# Patient Record
Sex: MALE | Race: BLACK OR AFRICAN AMERICAN | Employment: PART TIME | URBAN - METROPOLITAN AREA
[De-identification: names, ages, dates, MRNs, and addresses within clinical notes are randomized per-mention and may not be internally consistent; named-entity substitution may affect disease eponyms.]

---

## 2017-03-01 ENCOUNTER — OFFICE VISIT (OUTPATIENT)
Dept: INTERNAL MEDICINE CLINIC | Age: 27
End: 2017-03-01

## 2017-03-01 VITALS
WEIGHT: 268 LBS | TEMPERATURE: 97.8 F | OXYGEN SATURATION: 98 % | HEART RATE: 103 BPM | HEIGHT: 69 IN | DIASTOLIC BLOOD PRESSURE: 67 MMHG | SYSTOLIC BLOOD PRESSURE: 120 MMHG | RESPIRATION RATE: 15 BRPM | BODY MASS INDEX: 39.69 KG/M2

## 2017-03-01 DIAGNOSIS — L73.2 HIDRADENITIS: Primary | ICD-10-CM

## 2017-03-01 DIAGNOSIS — R07.89 CHEST TIGHTNESS: ICD-10-CM

## 2017-03-01 DIAGNOSIS — R05.9 COUGH: ICD-10-CM

## 2017-03-01 DIAGNOSIS — R06.89 TROUBLE BREATHING: ICD-10-CM

## 2017-03-01 RX ORDER — CLINDAMYCIN PHOSPHATE 11.9 MG/ML
SOLUTION TOPICAL
Qty: 60 ML | Refills: 5 | Status: SHIPPED | OUTPATIENT
Start: 2017-03-01 | End: 2017-06-27 | Stop reason: ALTCHOICE

## 2017-03-01 RX ORDER — METHYLPREDNISOLONE 4 MG/1
TABLET ORAL
Qty: 1 DOSE PACK | Refills: 0 | Status: SHIPPED | OUTPATIENT
Start: 2017-03-01 | End: 2017-06-27 | Stop reason: ALTCHOICE

## 2017-03-01 RX ORDER — DOXYCYCLINE 100 MG/1
100 TABLET ORAL DAILY
COMMUNITY
End: 2017-06-27 | Stop reason: ALTCHOICE

## 2017-03-01 RX ORDER — ALBUTEROL SULFATE 90 UG/1
2 AEROSOL, METERED RESPIRATORY (INHALATION)
Qty: 1 INHALER | Refills: 4 | Status: SHIPPED | OUTPATIENT
Start: 2017-03-01

## 2017-03-01 NOTE — MR AVS SNAPSHOT
Visit Information Date & Time Provider Department Dept. Phone Encounter #  
 3/1/2017  3:45 PM Niurka Samuels MD [facility] and [place] [id number] [id number] Follow-up Instructions Return in about 1 month (around 4/1/2017), or if symptoms worsen or fail to improve, for Asthma Control. Follow-up and Disposition History Your Appointments 3/1/2017  3:45 PM  
ACUTE CARE with Niurka Samuels MD  
[address] [place] [address] and Primary Care [facility] Appt Note: breathing issues [address] [facility]  
  
   
 [address] [id number] Upcoming Health Maintenance Date Due DTaP/Tdap/Td series (2 - Td) 3/1/2027 Allergies as of 3/1/2017  Review Complete On: 3/1/2017 By: Niurka Samuels MD  
 No Known Allergies Current Immunizations  Never Reviewed No immunizations on file. Not reviewed this visit You Were Diagnosed With   
  
 Codes Comments Hidradenitis    -  Primary ICD-10-CM: L73.2 ICD-9-CM: 705.83 Trouble breathing     ICD-10-CM: R06.89 
ICD-9-CM: 786.09 Cough     ICD-10-CM: R05 ICD-9-CM: 617. 2 Chest tightness     ICD-10-CM: R07.89 ICD-9-CM: 786.59 Vitals BP  
  
  
  
  
  
 120/67 (BP 1 Location: Left arm, BP Patient Position: Sitting) Vitals History BMI and BSA Data Body Mass Index Body Surface Area  
 39.58 kg/m 2 2.43 m 2 Preferred Pharmacy Pharmacy Name Phone [facility] [address] [place] [address] [phone] Your Updated Medication List  
  
   
This list is accurate as of: 3/1/17  3:41 PM.  Always use your most recent med list.  
  
  
  
  
 albuterol 90 mcg/actuation inhaler Commonly known as:  PROVENTIL HFA, VENTOLIN HFA, PROAIR HFA Take 2 Puffs by inhalation every four (4) hours as needed for Wheezing. clindamycin 1 % external solution Commonly known as:  CLEOCIN T  
use thin film on affected thigh and scalp areas. Wash hands thoruughly in between locations. doxycycline 100 mg tablet Commonly known as:  ADOXA Take 100 mg by mouth daily. ergocalciferol 50,000 unit capsule Commonly known as:  ERGOCALCIFEROL Take 1 capsule daily x 7 days, then take 1 capsule weekly x 7 weeks. Indications: VITAMIN D DEFICIENCY (HIGH DOSE THERAPY) methylPREDNISolone 4 mg tablet Commonly known as:  Tommie Etienne Per instruction Prescriptions Sent to Pharmacy Refills  
 clindamycin (CLEOCIN T) 1 % external solution 5 Sig: use thin film on affected thigh and scalp areas. Wash hands thoruughly in between locations. Class: Normal  
 Pharmacy: [address] Ph #: [phone]  
 methylPREDNISolone (MEDROL DOSEPACK) 4 mg tablet 0 Sig: Per instruction Class: Normal  
 Pharmacy: [address] Ph #: [phone]  
 albuterol (PROVENTIL HFA, VENTOLIN HFA, PROAIR HFA) 90 mcg/actuation inhaler 4 Sig: Take 2 Puffs by inhalation every four (4) hours as needed for Wheezing. Class: Normal  
 Pharmacy: [address] Ph #: [phone] Route: Inhalation Follow-up Instructions Return in about 1 month (around 4/1/2017), or if symptoms worsen or fail to improve, for Asthma Control. Introducing [facility]! Luisito Rivera introduces [employer] patient portal. Now you can access parts of your medical record, email your doctor's office, and request medication refills online. 1. In your internet browser, go to [email or web address] 2. Click on the First Time User? Click Here link in the Sign In box. You will see the New Member Sign Up page. 3. Enter your [employer] Access Code exactly as it appears below.  You will not need to use this code after youve completed the sign-up process. If you do not sign up before the expiration date, you must request a new code. · [employer] Access Code: [email or web address] Expires: 5/30/2017  3:41 PM 
 
4. Enter the last four digits of your Social Security Number (xxxx) and Date of Birth (mm/dd/yyyy) as indicated and click Submit. You will be taken to the next sign-up page. 5. Create a [employer] ID. This will be your [employer] login ID and cannot be changed, so think of one that is secure and easy to remember. 6. Create a [employer] password. You can change your password at any time. 7. Enter your Password Reset Question and Answer. This can be used at a later time if you forget your password. 8. Enter your e-mail address. You will receive e-mail notification when new information is available in [address] 9. Click Sign Up. You can now view and download portions of your medical record. 10. Click the Download Summary menu link to download a portable copy of your medical information. If you have questions, please visit the Frequently Asked Questions section of the [employer] website. Remember, [employer] is NOT to be used for urgent needs. For medical emergencies, dial 911. Now available from your iPhone and Android! Please provide this summary of care documentation to your next provider. Your primary care clinician is listed as [employer] If you have any questions after today's visit, please call [phone].

## 2017-03-01 NOTE — PROGRESS NOTES
Mr. Latonia Bunn is a 23y.o. year old male who had concerns including Breathing Problem; Skin Problem; and Chest Pain. HPI:  Chief Complaint    Patient presents with    Breathing Problem     for 4-5 months ; pt could not afford medication for inhaler and steroids.  Skin Problem     on scalp, itchy, for 1 year, medication that was prescribed is not helping, seen by dermatology - doxy bid x 2 weeks, then take daily- continuation was the plan  It helped but didn't clear up, persistent with refills.  Chest Pain     rated pain 4 out of 10 for 4-5months, sporadically        Past Medical History:   Diagnosis Date    Asthma     Bronchitis    Bronchitis      Current Outpatient Prescriptions   Medication Sig Dispense    clindamycin (CLEOCIN T) 1 % external solution use thin film on affected thigh and scalp areas. Wash hands thoruughly in between locations. 60 mL    methylPREDNISolone (MEDROL DOSEPACK) 4 mg tablet Per instruction 1 Dose Pack    albuterol (PROVENTIL HFA, VENTOLIN HFA, PROAIR HFA) 90 mcg/actuation inhaler Take 2 Puffs by inhalation every four (4) hours as needed for Wheezing. 1 Inhaler    doxycycline (ADOXA) 100 mg tablet Take 100 mg by mouth daily.  ergocalciferol (ERGOCALCIFEROL) 50,000 unit capsule Take 1 capsule daily x 7 days, then take 1 capsule weekly x 7 weeks. Indications: VITAMIN D DEFICIENCY (HIGH DOSE THERAPY) 14 Cap     No current facility-administered medications for this visit. Reviewed PmHx, RxHx, FmHx, SocHx, AllgHx and updated and dated in the chart.     ROS: Negative except for BOLD  General: fever, chills, fatigue  Respiratory: cough, SOB, wheezing  Cardiovascular:  CP, palpitation, CARTAGENA, edema   Gastrointestinal: N/V/D, bleeding  Genito-Urinary: dysuria, hematuria  Musculoskeletal: muscle weakness, pain, swelling    OBJECTIVE:   Visit Vitals    /67 (BP 1 Location: Left arm, BP Patient Position: Sitting)    Pulse (!) 103    Temp 97.8 °F (36.6 °C) (Oral)    Resp 15    Ht 5' 9\" (1.753 m)    Wt 268 lb (121.6 kg)    SpO2 98%    BMI 39.58 kg/m2     GEN: The patient appears well, alert, oriented x 3, in no distress. ENT: bilateral TM and canal normal.  Neck supple. No adenopathy or thyromegaly. KYM. Lungs: clear bilaterally, good air entry, no wheezes, rhonchi or rales. Cardiovascular: regular rate and rhythm. S1 and S2 normal, no murmurs,  Abdomen: + BS, soft without tenderness, guarding, rebound, mass or organomegaly. Extremities: no edema, normal peripheral pulses. Neurological: normal, gross sensory and motor in tact without focal findings. Derm; scaling and slight erythema, escoriation, dry scalp      Assessment/ Plan:       ICD-10-CM ICD-9-CM    1. Hidradenitis L73.2 705.83 clindamycin (CLEOCIN T) 1 % external solution   2. Trouble breathing R06.89 786.09 methylPREDNISolone (MEDROL DOSEPACK) 4 mg tablet   3. Cough R05 786.2 methylPREDNISolone (MEDROL DOSEPACK) 4 mg tablet   4. Chest tightness R07.89 786.59        Continue doxy per dermatology. I have discussed the diagnosis with the patient and the intended plan as seen in the above orders. The patient has received an after-visit summary and questions were answered concerning future plans. Medication Side Effects and Warnings were discussed with patient. Follow-up Disposition:  Return in about 1 month (around 4/1/2017), or if symptoms worsen or fail to improve, for Asthma Control.       Vish Alexandra MD

## 2017-03-01 NOTE — PROGRESS NOTES
Silvia Shankar is a 23 y.o. male  Chief Complaint   Patient presents with    Breathing Problem     for 4-5 months     Skin Problem     on scalp, itchy, for 1 year, medication that was prescribed is not helping     1. Have you been to the ER, urgent care clinic since your last visit? Hospitalized since your last visit? No    2. Have you seen or consulted any other health care providers outside of the [address] [place] [address] since your last visit? Include any pap smears or colon screening.  No

## 2017-06-27 ENCOUNTER — OFFICE VISIT (OUTPATIENT)
Dept: INTERNAL MEDICINE CLINIC | Age: 27
End: 2017-06-27

## 2017-06-27 VITALS
WEIGHT: 281.4 LBS | BODY MASS INDEX: 41.68 KG/M2 | SYSTOLIC BLOOD PRESSURE: 127 MMHG | OXYGEN SATURATION: 98 % | HEIGHT: 69 IN | DIASTOLIC BLOOD PRESSURE: 81 MMHG | TEMPERATURE: 97.9 F | RESPIRATION RATE: 16 BRPM | HEART RATE: 85 BPM

## 2017-06-27 DIAGNOSIS — J98.01 BRONCHOSPASM: Primary | ICD-10-CM

## 2017-06-27 DIAGNOSIS — M79.645 FINGER PAIN, LEFT: ICD-10-CM
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
## 2017-06-27 RX ORDER — AMOXICILLIN 500 MG/1
TABLET, FILM COATED ORAL 2 TIMES DAILY
COMMUNITY

## 2017-06-27 NOTE — PROGRESS NOTES
Mr. Radhika Hart is a 33y.o. year old male who had concerns including Asthma; Finger Swelling; and Ankle Pain. HPI:  Chief Complaint   Patient presents with    Asthma     Follow up, Pt is having \":breathing hesitation\" usually at work with lots of dust. He doesn't wear a mask ; significant improvement compared to before.  Finger Swelling     Left index finger. And pain    Ankle Pain     Pain around achilles and bump. Pt put ice on it, states swelling went down       Past Medical History:   Diagnosis Date    Asthma     Bronchitis    Bronchitis      Current Outpatient Prescriptions   Medication Sig Dispense    amoxicillin 500 mg tab Take  by mouth two (2) times a day.  albuterol (PROVENTIL HFA, VENTOLIN HFA, PROAIR HFA) 90 mcg/actuation inhaler Take 2 Puffs by inhalation every four (4) hours as needed for Wheezing. 1 Inhaler     No current facility-administered medications for this visit. Reviewed PmHx, RxHx, FmHx, SocHx, AllgHx and updated and dated in the chart. ROS: Negative except for BOLD  General: fever, chills, fatigue  Respiratory: cough, SOB, wheezing  Cardiovascular:  CP, palpitation, CARTAGENA, edema   Gastrointestinal: N/V/D, bleeding  Genito-Urinary: dysuria, hematuria  Musculoskeletal: muscle weakness, pain, swelling    OBJECTIVE:   Visit Vitals    /81 (BP 1 Location: Left arm, BP Patient Position: Sitting)    Pulse 85    Temp 97.9 °F (36.6 °C) (Oral)    Resp 16    Ht 5' 9\" (1.753 m)    Wt 281 lb 6.4 oz (127.6 kg)    SpO2 98%     L/min    BMI 41.56 kg/m2     GEN: The patient appears well, alert, oriented x 3, in no distress. ENT: bilateral TM and canal normal.  Neck supple. No adenopathy or thyromegaly. KYM. Neck: non inflammed papules nape of neck  Lungs: clear bilaterally, good air entry, no wheezes, rhonchi or rales. Cardiovascular: regular rate and rhythm.  S1 and S2 normal, no murmurs,  Abdomen: + BS, soft without tenderness, guarding, rebound, mass or organomegaly. Extremities: no edema, normal peripheral pulses. Neurological: normal, gross sensory and motor in tact without focal weakness  Hand: left index finger slightly tight DIP enderness, FROM finger, normal joint movements. No trigger. Normal IL wrist and shoulder exam. Normal CL hand exam.       Assessment/ Plan:       ICD-10-CM ICD-9-CM    1. Bronchospasm J98.01 519.11    2. Finger pain, left M79.645 729.5    3. Overuse injury T14.8 848.9    4. BMI 40.0-44.9, adult ([facility]) Z68.41 V85.41        Breathing at baseline goal. Weight loss, increase pulmonary exercise. Mask at work to minimize flare ups as tolerated. Amoxil from derm is clearning and keeps nape of neck folliculitis controlled. I have discussed the diagnosis with the patient and the intended plan as seen in the above orders. The patient has received an after-visit summary and questions were answered concerning future plans. Medication Side Effects and Warnings were discussed with patient. Follow-up Disposition:  Return in about 3 months (around 9/27/2017), or if symptoms worsen or fail to improve, for Asthma Control, Annual Exam, yearly.       Shawnee Silveira MD

## 2017-06-27 NOTE — PATIENT INSTRUCTIONS
Thumb Arthritis: Exercises  Your Care Instructions  Here are some examples of exercises for thumb arthritis. Start each exercise slowly. Ease off the exercise if you start to have pain. Your doctor or your physical or occupational therapist will tell you when you can start these exercises and which ones will work best for you. How to do the exercises  Thumb IP flexion    1. Place your forearm and hand on a table with your affected thumb pointing up. 2. With your other hand, hold your thumb steady just below the joint nearest your thumbnail. 3. Bend the tip of your thumb downward, then straighten it. 4. Repeat 8 to 12 times. 5. Switch hands and repeat steps 1 through 4, even if only one thumb is sore. Thumb MP flexion    1. Place your forearm and hand on a table with your affected thumb pointing up. 2. With your other hand, hold the base of your thumb and palm steady. 3. Bend your thumb downward where it meets your palm, then straighten it. 4. Repeat 8 to 12 times. 5. Switch hands and repeat steps 1 through 4, even if only one thumb is sore. Thumb opposition    1. With your affected hand, point your fingers and thumb straight up. Your wrist should be relaxed, following the line of your fingers and thumb. 2. Touch your affected thumb to each finger, one finger at a time. This will look like an \"okay\" sign, but try to keep your other fingers straight and pointing upward as much as you can. 3. Repeat 8 to 12 times. 4. Switch hands and repeat steps 1 through 3, even if only one thumb is sore. Follow-up care is a key part of your treatment and safety. Be sure to make and go to all appointments, and call your doctor if you are having problems. It's also a good idea to know your test results and keep a list of the medicines you take. Where can you learn more? Go to http://saul-ángel.info/. Enter F552 in the search box to learn more about \"Thumb Arthritis: Exercises. \"  Current as of: March 21, 2017  Content Version: 11.3  © 8332-5417 [employer]. Care instructions adapted under license by [employer] (which disclaims liability or warranty for this information). If you have questions about a medical condition or this instruction, always ask your healthcare professional. [address] any warranty or liability for your use of this information. Finger Fracture: Rehab Exercises  Your Care Instructions  Here are some examples of typical rehabilitation exercises for your condition. Start each exercise slowly. Ease off the exercise if you start to have pain. Your doctor or your physical or occupational therapist will tell you when you can start these exercises and which ones will work best for you. How to do the exercises  Finger extension    6. Place your hand flat on a table, palm down. 7. Lift and then lower your affected finger off the table. 8. Repeat 8 to 12 times. MP extension    6. Place your good hand on a table, palm up. Put your hand with the affected finger on top of your good hand with your fingers wrapped around the thumb of your good hand like you are making a fist.  7. Slowly uncurl the joints of your hand with the affected finger where your fingers connect to your hand so that only the top two joints of your fingers are bent. Your fingers will look like a hook. 8. Move back to your starting position, with your fingers wrapped around your good thumb. 9. Repeat 8 to 12 times. DIP flexion    5. With your good hand, grasp your affected finger. Your thumb will be on the top side of your finger just below the joint that is closest to your fingernail. 6. Slowly bend your affected finger only at the joint closest to your fingernail. Hold for about 6 seconds. 7. Repeat 8 to 12 times. PIP extension (with MP extension)    1. Place your good hand on a table, palm up.  Put your hand with the affected finger on top of your good hand.  2. Use the thumb and fingers of your good hand to grasp below the middle joint of your affected finger. 3. Bend and then straighten the last two joints of your affected finger. 4. Repeat 8 to 12 times. Isolated PIP flexion    1. Place the hand with the affected finger flat on a table, palm up. With your other hand, press down on the fingers that are not affected. Your affected finger will be free to move. 2. Slowly bend your affected finger. Hold for about 6 seconds. Then straighten your finger. 3. Repeat 8 to 12 times. Imaginary ball squeeze    1. Pretend to hold an imaginary ball. 2. Slowly bend your fingers around the imaginary ball, and squeeze the \"ball\" for about 6 seconds. Then slowly straighten your fingers to release the \"ball. \"  3. Repeat 8 to 12 times. Tendon glides    In this exercise, the steps follow one another to a make a continuous movement. 1. Hold your hand upward. Your fingers and thumb will be pointing straight up. Your wrist should be relaxed, following the line of your fingers and thumb. 2. Curl your fingers so that the top two joints in them are bent, and your fingers wrap down. Your fingertips should touch or be near the base of your fingers. Your fingers will look like a hook. 3. Make a fist by bending your knuckles. Your thumb can gently rest against your index (pointing) finger. 4. Unwind your fingers slightly so that your fingertips can touch the base of your palm. Your thumb can rest against your index finger. 5. Move back to your starting position, with your fingers and thumb pointing up. 6. Repeat the series of motions 8 to 12 times. Towel squeeze    1. Place a small towel roll on a table. 2. With your palm facing down, grab the towel and squeeze it for about 6 seconds. Then slowly straighten your fingers to release the towel. 3. Repeat 8 to 12 times. Towel grab    1. Fold a small towel in half, and lay it flat on a table.   2. Put your hand flat on the towel, palm down. Grab the towel, and scrunch it toward you until your hand is in a fist.  3. Slowly straighten your fingers to push the towel back so it is flat on the table again. 4. Repeat 8 to 12 times. Follow-up care is a key part of your treatment and safety. Be sure to make and go to all appointments, and call your doctor if you are having problems. It's also a good idea to know your test results and keep a list of the medicines you take. Where can you learn more? Go to http://saul-ángel.info/. Enter V403 in the search box to learn more about \"Finger Fracture: Rehab Exercises. \"  Current as of: March 21, 2017  Content Version: 11.3  © 5580-3099 [employer]. Care instructions adapted under license by [employer] (which disclaims liability or warranty for this information). If you have questions about a medical condition or this instruction, always ask your healthcare professional. [place] [address] any warranty or liability for your use of this information.

## 2017-06-27 NOTE — PROGRESS NOTES
There are no preventive care reminders to display for this patient. Chief Complaint   Patient presents with    Asthma     Follow up, Pt is having breathing hesitation     Finger Swelling     Left index finger. And pain    Ankle Pain     Pain around achilles and bump. Pt put ice on it, states swelling went down       1. Have you been to the ER, urgent care clinic since your last visit? Hospitalized since your last visit? No    2. Have you seen or consulted any other health care providers outside of the [address] [place] [address] since your last visit? Include any pap smears or colon screening. No    Patient is accompanied by self I have received verbal consent from [address] [place] [address],[address] to discuss any/all medical information while they are present in the room.

## 2017-06-27 NOTE — MR AVS SNAPSHOT
Visit Information Date & Time Provider Department Dept. Phone Encounter #  
 6/27/2017 10:45 AM Francisco Javier Gonzalez MD [facility] and [address] [place] [address] [phone] [id number] Follow-up Instructions Return in about 3 months (around 9/27/2017), or if symptoms worsen or fail to improve, for Asthma Control, Annual Exam, yearly. Upcoming Health Maintenance Date Due INFLUENZA AGE 9 TO ADULT 8/1/2017 DTaP/Tdap/Td series (2 - Td) 3/1/2027 Allergies as of 6/27/2017  Review Complete On: 6/27/2017 By: Trent Lopez No Known Allergies Current Immunizations  Never Reviewed No immunizations on file. Not reviewed this visit Vitals BP Pulse Temp Resp Height(growth percentile) Weight(growth percentile) 127/81 (BP 1 Location: Left arm, BP Patient Position: Sitting) 85 97.9 °F (36.6 °C) (Oral) 16 5' 9\" (1.753 m) 281 lb 6.4 oz (127.6 kg) SpO2 PF BMI Smoking Status 98% 660 L/min 41.56 kg/m2 Never Smoker Vitals History BMI and BSA Data Body Mass Index Body Surface Area 41.56 kg/m 2 2.49 m 2 Preferred Pharmacy Pharmacy Name Phone [facility] [address] [place] [address] [phone] Your Updated Medication List  
  
   
This list is accurate as of: 6/27/17 12:24 PM.  Always use your most recent med list.  
  
  
  
  
 albuterol 90 mcg/actuation inhaler Commonly known as:  PROVENTIL HFA, VENTOLIN HFA, PROAIR HFA Take 2 Puffs by inhalation every four (4) hours as needed for Wheezing. amoxicillin 500 mg Tab Take  by mouth two (2) times a day. Follow-up Instructions Return in about 3 months (around 9/27/2017), or if symptoms worsen or fail to improve, for Asthma Control, Annual Exam, yearly. Patient Instructions Thumb Arthritis: Exercises Your Care Instructions Here are some examples of exercises for thumb arthritis.  Start each exercise slowly. Ease off the exercise if you start to have pain. Your doctor or your physical or occupational therapist will tell you when you can start these exercises and which ones will work best for you. How to do the exercises Thumb IP flexion 1. Place your forearm and hand on a table with your affected thumb pointing up. 2. With your other hand, hold your thumb steady just below the joint nearest your thumbnail. 3. Bend the tip of your thumb downward, then straighten it. 4. Repeat 8 to 12 times. 5. Switch hands and repeat steps 1 through 4, even if only one thumb is sore. Thumb MP flexion 1. Place your forearm and hand on a table with your affected thumb pointing up. 2. With your other hand, hold the base of your thumb and palm steady. 3. Bend your thumb downward where it meets your palm, then straighten it. 4. Repeat 8 to 12 times. 5. Switch hands and repeat steps 1 through 4, even if only one thumb is sore. Thumb opposition 1. With your affected hand, point your fingers and thumb straight up. Your wrist should be relaxed, following the line of your fingers and thumb. 2. Touch your affected thumb to each finger, one finger at a time. This will look like an \"okay\" sign, but try to keep your other fingers straight and pointing upward as much as you can. 3. Repeat 8 to 12 times. 4. Switch hands and repeat steps 1 through 3, even if only one thumb is sore. Follow-up care is a key part of your treatment and safety. Be sure to make and go to all appointments, and call your doctor if you are having problems. It's also a good idea to know your test results and keep a list of the medicines you take. Where can you learn more? Go to http://saul-ángel.info/. Enter D181 in the search box to learn more about \"Thumb Arthritis: Exercises. \" Current as of: March 21, 2017 Content Version: 11.3 © 8742-7769 [employer].  Care instructions adapted under license by [address] (which disclaims liability or warranty for this information). If you have questions about a medical condition or this instruction, always ask your healthcare professional. [address] any warranty or liability for your use of this information. Finger Fracture: Rehab Exercises Your Care Instructions Here are some examples of typical rehabilitation exercises for your condition. Start each exercise slowly. Ease off the exercise if you start to have pain. Your doctor or your physical or occupational therapist will tell you when you can start these exercises and which ones will work best for you. How to do the exercises Finger extension 6. Place your hand flat on a table, palm down. 7. Lift and then lower your affected finger off the table. 8. Repeat 8 to 12 times. MP extension 6. Place your good hand on a table, palm up. Put your hand with the affected finger on top of your good hand with your fingers wrapped around the thumb of your good hand like you are making a fist. 
7. Slowly uncurl the joints of your hand with the affected finger where your fingers connect to your hand so that only the top two joints of your fingers are bent. Your fingers will look like a hook. 8. Move back to your starting position, with your fingers wrapped around your good thumb. 9. Repeat 8 to 12 times. DIP flexion 5. With your good hand, grasp your affected finger. Your thumb will be on the top side of your finger just below the joint that is closest to your fingernail. 6. Slowly bend your affected finger only at the joint closest to your fingernail. Hold for about 6 seconds. 7. Repeat 8 to 12 times. PIP extension (with MP extension) 1. Place your good hand on a table, palm up. Put your hand with the affected finger on top of your good hand. 2. Use the thumb and fingers of your good hand to grasp below the middle joint of your affected finger. 3. Bend and then straighten the last two joints of your affected finger. 4. Repeat 8 to 12 times. Isolated PIP flexion 1. Place the hand with the affected finger flat on a table, palm up. With your other hand, press down on the fingers that are not affected. Your affected finger will be free to move. 2. Slowly bend your affected finger. Hold for about 6 seconds. Then straighten your finger. 3. Repeat 8 to 12 times. Imaginary ball squeeze 1. Pretend to hold an imaginary ball. 2. Slowly bend your fingers around the imaginary ball, and squeeze the \"ball\" for about 6 seconds. Then slowly straighten your fingers to release the \"ball. \" 3. Repeat 8 to 12 times. Tendon glides In this exercise, the steps follow one another to a make a continuous movement. 1. Hold your hand upward. Your fingers and thumb will be pointing straight up. Your wrist should be relaxed, following the line of your fingers and thumb. 2. Curl your fingers so that the top two joints in them are bent, and your fingers wrap down. Your fingertips should touch or be near the base of your fingers. Your fingers will look like a hook. 3. Make a fist by bending your knuckles. Your thumb can gently rest against your index (pointing) finger. 4. Unwind your fingers slightly so that your fingertips can touch the base of your palm. Your thumb can rest against your index finger. 5. Move back to your starting position, with your fingers and thumb pointing up. 6. Repeat the series of motions 8 to 12 times. Towel squeeze 1. Place a small towel roll on a table. 2. With your palm facing down, grab the towel and squeeze it for about 6 seconds. Then slowly straighten your fingers to release the towel. 3. Repeat 8 to 12 times. Towel grab 1. Fold a small towel in half, and lay it flat on a table. 2. Put your hand flat on the towel, palm down.  Grab the towel, and scrunch it toward you until your hand is in a fist. 
 3. Slowly straighten your fingers to push the towel back so it is flat on the table again. 4. Repeat 8 to 12 times. Follow-up care is a key part of your treatment and safety. Be sure to make and go to all appointments, and call your doctor if you are having problems. It's also a good idea to know your test results and keep a list of the medicines you take. Where can you learn more? Go to http://saul-ángel.info/. Enter V403 in the search box to learn more about \"Finger Fracture: Rehab Exercises. \" Current as of: March 21, 2017 Content Version: 11.3 © 7742-6629 [employer]. Care instructions adapted under license by [employer] (which disclaims liability or warranty for this information). If you have questions about a medical condition or this instruction, always ask your healthcare professional. [address] any warranty or liability for your use of this information. Introducing [facility]! Stanford Deluna introduces [employer] patient portal. Now you can access parts of your medical record, email your doctor's office, and request medication refills online. 1. In your internet browser, go to [email or web address] 2. Click on the First Time User? Click Here link in the Sign In box. You will see the New Member Sign Up page. 3. Enter your [employer] Access Code exactly as it appears below. You will not need to use this code after youve completed the sign-up process. If you do not sign up before the expiration date, you must request a new code. · [employer] Access Code: [email or web address] Expires: 9/25/2017 12:24 PM 
 
4. Enter the last four digits of your Social Security Number (xxxx) and Date of Birth (mm/dd/yyyy) as indicated and click Submit. You will be taken to the next sign-up page. 5. Create a [employer] ID.  This will be your [employer] login ID and cannot be changed, so think of one that is secure and easy to remember. 6. Create a [employer] password. You can change your password at any time. 7. Enter your Password Reset Question and Answer. This can be used at a later time if you forget your password. 8. Enter your e-mail address. You will receive e-mail notification when new information is available in [address] 9. Click Sign Up. You can now view and download portions of your medical record. 10. Click the Download Summary menu link to download a portable copy of your medical information. If you have questions, please visit the Frequently Asked Questions section of the [employer] website. Remember, [employer] is NOT to be used for urgent needs. For medical emergencies, dial 911. Now available from your iPhone and Android! Please provide this summary of care documentation to your next provider. Your primary care clinician is listed as Indigo Watson. If you have any questions after today's visit, please call [phone].

## 2017-09-26 ENCOUNTER — OFFICE VISIT (OUTPATIENT)
Dept: INTERNAL MEDICINE CLINIC | Age: 27
End: 2017-09-26

## 2017-09-26 VITALS
WEIGHT: 284 LBS | HEIGHT: 69 IN | HEART RATE: 71 BPM | TEMPERATURE: 98.5 F | DIASTOLIC BLOOD PRESSURE: 77 MMHG | RESPIRATION RATE: 18 BRPM | OXYGEN SATURATION: 95 % | BODY MASS INDEX: 42.06 KG/M2 | SYSTOLIC BLOOD PRESSURE: 119 MMHG

## 2017-09-26 DIAGNOSIS — J45.20 MILD INTERMITTENT ASTHMA WITHOUT COMPLICATION: Primary | ICD-10-CM

## 2017-09-26 NOTE — PROGRESS NOTES
Mr. George Joya is a 33y.o. year old male who had concerns including Asthma. HPI:  Chief Complaint   Patient presents with    Asthma     (RM 6) 3 mo f/u     No current flare up, getting better from URI. No wheezing. Past Medical History:   Diagnosis Date    Asthma     Bronchitis    Bronchitis      Current Outpatient Prescriptions   Medication Sig Dispense    amoxicillin 500 mg tab Take  by mouth two (2) times a day.  albuterol (PROVENTIL HFA, VENTOLIN HFA, PROAIR HFA) 90 mcg/actuation inhaler Take 2 Puffs by inhalation every four (4) hours as needed for Wheezing. 1 Inhaler     No current facility-administered medications for this visit. Reviewed PmHx, RxHx, FmHx, SocHx, AllgHx and updated and dated in the chart. ROS: Negative except for BOLD  General: fever, chills, fatigue  Respiratory: cough, SOB, wheezing  Cardiovascular:  CP, palpitation, CARTAGENA, edema   Gastrointestinal: N/V/D, bleeding  Genito-Urinary: dysuria, hematuria  Musculoskeletal: muscle weakness, pain, swelling    OBJECTIVE:   Visit Vitals    /77    Pulse 71    Temp 98.5 °F (36.9 °C) (Oral)    Resp 18    Ht 5' 9\" (1.753 m)    Wt 284 lb (128.8 kg)    SpO2 95%    BMI 41.94 kg/m2     GEN: The patient appears well, alert, oriented x 3, in no distress. ENT: bilateral TM and canal normal.  Neck supple. No adenopathy or thyromegaly. KYM. Lungs: clear bilaterally, good air entry, no wheezes, rhonchi or rales. Cardiovascular: regular rate and rhythm. S1 and S2 normal, no murmurs,  Abdomen: + BS, soft without tenderness, guarding, rebound, mass or organomegaly. Extremities: no edema, normal peripheral pulses. Neurological: normal, gross sensory and motor in tact without focal findings. Assessment/ Plan:       ICD-10-CM ICD-9-CM    1. Mild intermittent asthma without complication H92.99 765.08        Stable, with season changing.  Acute prevention and treatment discused    I have discussed the diagnosis with the patient and the intended plan as seen in the above orders. The patient has received an after-visit summary and questions were answered concerning future plans. Medication Side Effects and Warnings were discussed with patient. Follow-up Disposition:  Return for Annual Exam, yearly.       Katiuska Bryan MD

## 2017-09-26 NOTE — PATIENT INSTRUCTIONS
Learning About Asthma Triggers  What are triggers? When you have asthma, certain things can make your symptoms worse. These are called triggers. They include:  · Cigarette smoke or air pollution. · Things you are allergic to, such as:  ¨ Pollen, mold, or dust mites. ¨ Pet hair, skin, or saliva. · Illnesses, like colds, flu, or pneumonia. · Exercise. · Dry, cold air. How do triggers affect asthma? Triggers can make it harder for your lungs to work as they should and can lead to sudden difficulty breathing and other symptoms. When you are around a trigger, an asthma attack is more likely. If your symptoms are severe, you may need emergency treatment or have to go to the hospital for treatment. If you know what your triggers are and can avoid them, you may be able to prevent asthma attacks, reduce how often you have them, and make them less severe. What can you do to avoid triggers? The first thing is to know your triggers. When you are having symptoms, note the things around you that might be causing them. Then look for patterns in what may be triggering your symptoms. Record your triggers on a piece of paper or in an asthma diary. When you have your list of possible triggers, work with your doctor to find ways to avoid them. You also can check how well your lungs are working by measuring your peak expiratory flow (PEF) throughout the day. Your PEF may drop when you are near things that trigger symptoms. Here are some ways to avoid a few common triggers. · Do not smoke or allow others to smoke around you. If you need help quitting, talk to your doctor about stop-smoking programs and medicines. These can increase your chances of quitting for good. · If there is a lot of pollution, pollen, or dust outside, stay at home and keep your windows closed. Use an air conditioner or air filter in your home. Check your local weather report or newspaper for air quality and pollen reports.   · Get a flu shot every year. Talk to your doctor about getting a pneumococcal shot. Wash your hands often to prevent infections. · Avoid exercising outdoors in cold weather. If you are outdoors in cold weather, wear a scarf around your face and breathe through your nose. How can you manage an asthma attack? · If you have an asthma action plan, follow the plan. In general:  ¨ Use your quick-relief inhaler as directed by your doctor. If your symptoms do not get better after you use your medicine, have someone take you to the emergency room. Call an ambulance if needed. ¨ If your doctor has given you other inhaled medicines or steroid pills, take them as directed. Where can you learn more? Go to [employer].be  Enter M564 in the search box to learn more about \"Learning About Asthma Triggers. \"   © 0591-5069 [employer]wise, Incorporated. Care instructions adapted under license by Willadean Saint (which disclaims liability or warranty for this information). This care instruction is for use with your licensed healthcare professional. If you have questions about a medical condition or this instruction, always ask your healthcare professional. [place] [address] any warranty or liability for your use of this information. Content Version: 89.9.856992;  Last Revised: February 23, 2012

## 2017-09-26 NOTE — PROGRESS NOTES
Chief Complaint   Patient presents with    Asthma     (RM 6) 3 mo f/u     1. Have you been to the ER, urgent care clinic since your last visit? Hospitalized since your last visit? NO  2. Have you seen or consulted any other health care providers outside of the [address] [place] [address] since your last visit? Include any pap smears or colon screening.  No

## 2017-12-12 ENCOUNTER — HOSPITAL ENCOUNTER (EMERGENCY)
Age: 27
Discharge: HOME | End: 2017-12-12
Attending: EMERGENCY MEDICINE
Payer: COMMERCIAL

## 2017-12-12 VITALS
TEMPERATURE: 98.3 F | HEART RATE: 87 BPM | SYSTOLIC BLOOD PRESSURE: 147 MMHG | RESPIRATION RATE: 16 BRPM | OXYGEN SATURATION: 96 % | WEIGHT: 270 LBS | DIASTOLIC BLOOD PRESSURE: 81 MMHG | HEIGHT: 69 IN | BODY MASS INDEX: 39.99 KG/M2

## 2017-12-12 DIAGNOSIS — H61.891 FOREIGN BODY SENSATION IN RIGHT EAR CANAL: Primary | ICD-10-CM

## 2017-12-12 PROCEDURE — 99282 EMERGENCY DEPT VISIT SF MDM: CPT

## 2017-12-12 NOTE — DISCHARGE INSTRUCTIONS
Learning About Your Ears  What do your ears do? Your ears make it possible for you to hear. They are also important in helping you keep your balance. The ear is made up of the external ear canal, middle ear, and inner ear. The middle ear is  from the ear canal by the eardrum. The middle ear contains the malleus, incus, and stapes bones, which are also known as the hammer, anvil, and stirrup. The inner ear contains the cochlea, which is the main sensory organ of hearing. The eustachian tube runs from the middle ear to the back part of the nose. How the ear works  · Sound waves enter the ear through the ear canal and strike the eardrum. · The eardrum vibrates, and the vibrations move to the bones of the middle ear. · In response, the bones of the middle ear vibrate, magnifying the sound and sending it to the inner ear. · Sound vibrations cause the fluid in the inner ear to move, which bends tiny hair cells (cilia) in the cochlea. · The movement of the hair cells creates nerve impulses, which travel along the cochlear nerve to the brain and are interpreted as sound. What problems can happen to your ears? Problems with your ears may include:  · Infection of the middle ear (otitis media). · Swelling or infection of the ear canal (swimmer's ear). · Backup of fluid behind the eardrum (otitis media with effusion). · Hearing loss. · Labyrinthitis and BPPV (benign paroxysmal positional vertigo), which are problems in the inner ear. They may cause vertigo. Vertigo makes you feel like you're spinning or whirling. · Tinnitus, which occurs when you have ringing sounds (or roaring, hissing, buzzing, or tinkling) in your ears all the time. · A ruptured eardrum, which is a tear or hole in the membrane of the middle ear. How can you prevent ear problems? · Blow your nose gently.   · Keep soap and shampoo out of the ear canal. These products can cause itching, which can be mistaken for an ear infection because of the need to scratch or pull at the ears. · Do not put cotton swabs, john pins, or other objects (especially if they are sharp) in the ear canal.  · Limit or avoid exposure to loud music, power tools, gunshots, and heavy machinery. Wear protective earplugs or earmuffs if you cannot avoid loud noises. ¨ Avoid wearing earplugs too often or for too long. They can irritate your ears. ¨ Do not use wadded-up tissue or cotton balls. They don't work well. · Do not smoke or allow others to smoke around you. Smoking may make ear problems more likely. If you need help quitting, talk to your doctor about stop-smoking programs and medicines. These can increase your chances of quitting for good. · If you wear hearing aids, be sure to follow the recommendations carefully for cleaning and storing them. Where can you learn more? Go to http://saul-ángel.info/. Enter X723 in the search box to learn more about \"Learning About Your Ears. \"  Current as of: May 12, 2017  Content Version: 11.4  © 3264-3711 Healthwise, Incorporated. Care instructions adapted under license by [employer] (which disclaims liability or warranty for this information). If you have questions about a medical condition or this instruction, always ask your healthcare professional. [address] any warranty or liability for your use of this information.

## 2017-12-12 NOTE — ED PROVIDER NOTES
Patient is a 33 y.o. male presenting with foreign body in ear. The history is provided by the patient. Foreign Body in [address] [place] [address]   Episode onset: pt states he cleaned his ears this morning with qtip and when he pulled it out he didn't the the cotton tip so thinks it may still be in ear. The foreign body is suspected to be in the right ear. Pertinent negatives include no fever, no hearing loss and no drainage. Associated symptoms comments: Pt denies any pain. Past Medical History:   Diagnosis Date    Asthma     Bronchitis    Bronchitis        No past surgical history on file. Family History:   Problem Relation Age of Onset    Diabetes Mother     Hypertension Mother     Heart Disease Mother     No Known Problems Father     Hypertension Brother        Social History     Social History    Marital status: SINGLE     Spouse name: N/A    Number of children: N/A    Years of education: N/A     Occupational History    Not on file. Social History Main Topics    Smoking status: Never Smoker    Smokeless tobacco: Never Used    Alcohol use Yes      Comment: occasionally    Drug use: Yes     Special: Marijuana    Sexual activity: Yes     Other Topics Concern    Not on file     Social History Narrative         ALLERGIES: Review of patient's allergies indicates no known allergies. Review of Systems   Constitutional: Negative for fever. HENT: Negative for ear discharge, ear pain and hearing loss. Neurological: Negative for speech difficulty. All other systems reviewed and are negative. Vitals:    12/12/17 1826   BP: 147/81   Pulse: 87   Resp: 16   Temp: 98.3 °F (36.8 °C)   SpO2: 96%   Weight: 122.5 kg (270 lb)   Height: 5' 9\" (1.753 m)            Physical Exam   Constitutional: He is oriented to person, place, and time. He appears well-developed and well-nourished. No distress. HENT:   Head: Normocephalic and atraumatic. Right Ear: Tympanic membrane normal. No foreign bodies.  Tympanic membrane is not erythematous. Left Ear: Tympanic membrane normal. No foreign bodies. Tympanic membrane is not erythematous. Mouth/Throat: Oropharynx is clear and moist. No oropharyngeal exudate. Eyes: Conjunctivae are normal.   Cardiovascular: Normal rate, regular rhythm and normal heart sounds. Pulmonary/Chest: Effort normal and breath sounds normal. No respiratory distress. He has no wheezes. He has no rales. Musculoskeletal: Normal range of motion. Neurological: He is alert and oriented to person, place, and time. Skin: Skin is warm and dry. Psychiatric: He has a normal mood and affect. His behavior is normal. Judgment and thought content normal.   Nursing note and vitals reviewed. at 6:42 PM       MDM  Number of Diagnoses or Management Options  Foreign body sensation in right ear canal:   Diagnosis management comments: DDX: FB v sensation, OM    ED Course       Procedures  MEDICATIONS GIVEN:  Medications - No data to display    LABS REVIEWED:  No results found for this or any previous visit (from the past 24 hour(s)). RADIOLOGY RESULTS:  The following have been ordered and reviewed:  No orders to display       PROGRESS NOTE:  A/P  6:41 PM  The patient's signs, symptoms, diagnosis, and discharge instruction have been discussed and the patient has conveyed their understanding. The patient is to follow up with PCP  or return to the ER should their symptoms worsen. Plan has been discussed and the patient is in agreement. Pt is ready for discharge      DIAGNOSIS:  1.  Foreign body sensation in right ear canal        PLAN:  Follow-up Information     Follow up With Details Comments [id number] Alfonso Ferrara MD Schedule an appointment as soon as possible for a visit in 1 week As needed [address]  [phone]          Current Discharge Medication List      CONTINUE these medications which have NOT CHANGED    Details   amoxicillin 500 mg tab Take  by mouth two (2) times a day. albuterol (PROVENTIL HFA, VENTOLIN HFA, PROAIR HFA) 90 mcg/actuation inhaler Take 2 Puffs by inhalation every four (4) hours as needed for Wheezing.   Qty: 1 Inhaler, Refills: 4

## 2017-12-13 NOTE — ED NOTES
Patient (s)  given copy of dc instructions and 0 script(s). Patient (s)  verbalized understanding of instructions and script (s). Patient given a current medication reconciliation form and verbalized understanding of their medications. Patient (s) verbalized understanding of the importance of discussing medications with  his or her physician or clinic they will be following up with. Patient alert and oriented and in no acute distress. Patient discharged home ambulatory.